# Patient Record
Sex: MALE | Employment: FULL TIME | URBAN - METROPOLITAN AREA
[De-identification: names, ages, dates, MRNs, and addresses within clinical notes are randomized per-mention and may not be internally consistent; named-entity substitution may affect disease eponyms.]

---

## 2020-12-23 ENCOUNTER — VIRTUAL VISIT (OUTPATIENT)
Dept: CONSULT | Facility: CLINIC | Age: 20
End: 2020-12-23
Attending: GENETIC COUNSELOR, MS
Payer: COMMERCIAL

## 2020-12-23 DIAGNOSIS — Z13.71 SCREENING FOR GENETIC DISEASE CARRIER STATUS: Primary | ICD-10-CM

## 2020-12-23 DIAGNOSIS — Z31.5 ENCOUNTER FOR PROCREATIVE GENETIC COUNSELING: ICD-10-CM

## 2020-12-23 PROCEDURE — 96040 HC GENETIC COUNSELING, EACH 30 MINUTES: CPT | Mod: GT | Performed by: GENETIC COUNSELOR, MS

## 2020-12-23 NOTE — LETTER
"  12/23/2020      RE: Perry Liu  [address]  [facility] [address]       Perry Liu is a 20 year old male who is being evaluated via a billable video visit.      The patient has been notified of following:     \"This video visit will be conducted via a call between you and your physician/provider. We have found that certain health care needs can be provided without the need for an in-person physical exam.  This service lets us provide the care you need with a video conversation.  If a prescription is necessary we can send it directly to your pharmacy.  If lab work is needed we can place an order for that and you can then stop by our lab to have the test done at a later time.    Video visits are billed at different rates depending on your insurance coverage.  Please reach out to your insurance provider with any questions.    If during the course of the call the physician/provider feels a video visit is not appropriate, you will not be charged for this service.\"    Patient has given verbal consent for Video visit? Yes  How would you like to obtain your AVS? Mail a copy  If you are dropped from the video visit, the video invite should be resent to: Send to e-mail at: No e-mail address on record  Will anyone else be joining your video visit? Caitlyn Penaloza LPN      Presenting Information:  Perry \"Jean\" Noe is a 20-year-old man whose wife Sarah has a family history of phenylketonuria (PKU).  The couple is planning to start a family and is interested in carrier screening for this condition.  I met with Jean and Sarah at their request for a virtual genetic counseling appointment.  During our visit a family history was collected, the genetics and inheritance of PKU were reviewed, options for carrier screening were discussed, and informed consent for genetic testing was obtained.     Family History:  A detailed pedigree was obtained and scanned into the electronic medical record.  It is " "significant for the following:     Jean is 20-years-old.  He has a history of SVT requiring medication until age 6, but reports he has \"grown out\" of this.  He is otherwise healthy.      Jean has a maternal half-sister who passed away due to AML at age 17.      Jean's mother is 45-years-old and thought to be healthy, but there is limited contact.  Jean is not in contact with his biological father.      Jean has no known family history of PKU.    Jean's wife Sarah has a history of Crohn's Disease, PCOS, chronic fatigue, endometriosis, and fibromyalgia.      Sarah two paternal half-brothers and three paternal half-sisters, all of whom are thought to be healthy, but there is limited contact.      Sarah has three biological maternal half-brothers, two of whom have PKU.      Sarah's mother is 46-years-old.  She has diabetes.  Sarah is not in contact with her biological father.      There is no reported family history of birth defects, developmental delay, or other known or suspected genetic conditions.     Jean is of [place] and [place] ancestry.  Sarah is of Lithuanian and Mac[place] ancestry.  Consanguinity was denied.     Discussion:  Today we reviewed that genes are long stretches of DNA that are responsible for how our bodies look and how our bodies work. We all have two copies of every gene; one inherited from the mother and one inherited from the father. When there is a change, called a mutation, in a gene it can cause it to not do its job correctly which can cause the signs and symptoms of a genetic condition.  PKU is caused by mutations in a gene called PAH.    The PAH gene is normally important for creating an enzyme that breaks down phenylalanine (Phe). Mutations in the PAH gene cause this enzyme to not do its job the way it is supposed to, resulting in phenylalanine not being broken down. This is toxic to the cells and unless a low phe diet is followed, has serious health and developmental consequences.    PKU is " inherited in an autosomal recessive pattern. This means that to be affected an individual must inherit a mutation in both copies of the PAH gene (one from each parent). Individuals with just one mutation in the PAH gene are said to be carriers. Carriers do not have cystic fibrosis but can have an affected child if their partner is also a carrier. When both parents are carriers, with each pregnancy there is a 25% chance for the child to be affected, a 50% chance to be a carrier only, and a 25% chance to be unaffected and not a carrier.    As noted above, Jean's wife Sarah has two maternal half-brothers with PKU.  This means Sarah's mother is an obligate carrier for PKU, and Sarah therefore has a 50% (1/2) chance to also be a carrier.  Jean has an approximately 2% (1/50) chance to be a carrier for PKU.  Without any testing, with each pregnancy the couple's chance to have a child with PKU is approximately 1/400.  This is compared to the general population chance of 1/10,000.  Carrier screening for Tonie can help refine this chance.      We reviewed the options for carrier screening including for PKU only, or expanded carrier screening for a large panel of genes in addition to PKU.  After a detailed discussion about the potential costs, benefits, and limitations of the options, Tonie opted to proceed with expanded carrier screening.  Verbal informed consent was obtained.  This will be performed at [facility] laboratory who will contact the couple directly with an estimate of insurance benefits and option for out of pocket billing.  Results are expected in approximately 2-3 weeks and I will contact the couple by telephone when results return.  Tonie gave verbal consent to share their results with one another.      Finally, we briefly discussed reproductive options available to the family in the event they are both found to be a carrier for PKU or another condition.   If the family wished to  determine during a future pregnancy if the baby is affected, prenatal testing is available.  This could be completed by chorionic villus sampling (CVS) or amniocentesis.  These procedures obtain a small sample of either the placenta or the amniotic fluid respectively to test for PKU.  Both of these procedures carry a small risk for miscarriage.  If the couple wished to avoid an affected pregnancy there is an option called pre-implantation genetic diagnosis (PGD).  PGD uses in vitro fertilization (IVF) to create very early embryos outside of the body.  The embryos are then tested for the condition and only the unaffected embryos are implanted.  Alternative options include egg or sperm donation, or adoption.  The family could also, of course, wait until a baby is born to be tested.  With each of these options there are medical, financial, ethical, and emotional considerations that every family must weight for themselves.      I appreciate the opportunity to be a part of Jean and his wife's care today.  They are encouraged to contact me with any additional questions or concerns.     Plan:  1.  Expanded carrier screening for Jean and his wife Sarah to [facility]; a saliva collection kit will be sent to their home  2.  [employer] will contact Jean directly with an estimate of insurance benefits   3.  I will contact Jean and Sarah by telephone when results return  4.  Additional genetic counseling as determined by results of the above testing      Neetu Jc MS [facility] [place]  Genetic Counselor  Division of Genetics and Metabolism      Video Visit Start Time: 12:55pm  Video Visit End Time: 1:29pm          Neetu Jc GC

## 2020-12-23 NOTE — LETTER
Date:January 6, 2021      Patient was self referred, no letter generated. Do not send.        [facility] Health Information

## 2020-12-23 NOTE — PROGRESS NOTES
"Perry Liu is a 20 year old male who is being evaluated via a billable video visit.      The patient has been notified of following:     \"This video visit will be conducted via a call between you and your physician/provider. We have found that certain health care needs can be provided without the need for an in-person physical exam.  This service lets us provide the care you need with a video conversation.  If a prescription is necessary we can send it directly to your pharmacy.  If lab work is needed we can place an order for that and you can then stop by our lab to have the test done at a later time.    Video visits are billed at different rates depending on your insurance coverage.  Please reach out to your insurance provider with any questions.    If during the course of the call the physician/provider feels a video visit is not appropriate, you will not be charged for this service.\"    Patient has given verbal consent for Video visit? Yes  How would you like to obtain your AVS? Mail a copy  If you are dropped from the video visit, the video invite should be resent to: Send to e-mail at: No e-mail address on record  Will anyone else be joining your video visit? Caitlyn Penaloza LPN      Presenting Information:  Perry \"Jean\"Neo is a 20-year-old man whose wife Sarah has a family history of phenylketonuria (PKU).  The couple is planning to start a family and is interested in carrier screening for this condition.  I met with Jean and Sarah at their request for a virtual genetic counseling appointment.  During our visit a family history was collected, the genetics and inheritance of PKU were reviewed, options for carrier screening were discussed, and informed consent for genetic testing was obtained.     Family History:  A detailed pedigree was obtained and scanned into the electronic medical record.  It is significant for the following:     Jean is 20-years-old.  He has a history of SVT requiring " "medication until age 6, but reports he has \"grown out\" of this.  He is otherwise healthy.      Jean has a maternal half-sister who passed away due to AML at age 17.      Jean's mother is 45-years-old and thought to be healthy, but there is limited contact.  Jean is not in contact with his biological father.      Jean has no known family history of PKU.    Jean's wife Sarah has a history of Crohn's Disease, PCOS, chronic fatigue, endometriosis, and fibromyalgia.      Sarah two paternal half-brothers and three paternal half-sisters, all of whom are thought to be healthy, but there is limited contact.      Sarah has three biological maternal half-brothers, two of whom have PKU.      Sarah's mother is 46-years-old.  She has diabetes.  Sarah is not in contact with her biological father.      There is no reported family history of birth defects, developmental delay, or other known or suspected genetic conditions.     Jean is of Kisw[place]ili and Occitan ancestry.  Sarah is of [place] and [place] ancestry.  Consanguinity was denied.     Discussion:  Today we reviewed that genes are long stretches of DNA that are responsible for how our bodies look and how our bodies work. We all have two copies of every gene; one inherited from the mother and one inherited from the father. When there is a change, called a mutation, in a gene it can cause it to not do its job correctly which can cause the signs and symptoms of a genetic condition.  PKU is caused by mutations in a gene called PAH.    The PAH gene is normally important for creating an enzyme that breaks down phenylalanine (Phe). Mutations in the PAH gene cause this enzyme to not do its job the way it is supposed to, resulting in phenylalanine not being broken down. This is toxic to the cells and unless a low phe diet is followed, has serious health and developmental consequences.    PKU is inherited in an autosomal recessive pattern. This means that to be affected an individual " must inherit a mutation in both copies of the PAH gene (one from each parent). Individuals with just one mutation in the PAH gene are said to be carriers. Carriers do not have cystic fibrosis but can have an affected child if their partner is also a carrier. When both parents are carriers, with each pregnancy there is a 25% chance for the child to be affected, a 50% chance to be a carrier only, and a 25% chance to be unaffected and not a carrier.    As noted above, Jean's wife Sarah has two maternal half-brothers with PKU.  This means Sarah's mother is an obligate carrier for PKU, and Sarah therefore has a 50% (1/2) chance to also be a carrier.  Jean has an approximately 2% (1/50) chance to be a carrier for PKU.  Without any testing, with each pregnancy the couple's chance to have a child with PKU is approximately 1/400.  This is compared to the general population chance of 1/10,000.  Carrier screening for Tonie can help refine this chance.      We reviewed the options for carrier screening including for PKU only, or expanded carrier screening for a large panel of genes in addition to PKU.  After a detailed discussion about the potential costs, benefits, and limitations of the options, Tonie opted to proceed with expanded carrier screening.  Verbal informed consent was obtained.  [facility] will contact the couple directly with an estimate of insurance benefits and option for out of pocket billing.  Results are expected in approximately 2-3 weeks and I will contact the couple by telephone when results return.  Tonie gave verbal consent to share their results with one another.      Finally, we briefly discussed reproductive options available to the family in the event they are both found to be a carrier for PKU or another condition.   If the family wished to determine during a future pregnancy if the baby is affected, prenatal testing is available.  This  could be completed by chorionic villus sampling (CVS) or amniocentesis.  These procedures obtain a small sample of either the placenta or the amniotic fluid respectively to test for PKU.  Both of these procedures carry a small risk for miscarriage.  If the couple wished to avoid an affected pregnancy there is an option called pre-implantation genetic diagnosis (PGD).  PGD uses in vitro fertilization (IVF) to create very early embryos outside of the body.  The embryos are then tested for the condition and only the unaffected embryos are implanted.  Alternative options include egg or sperm donation, or adoption.  The family could also, of course, wait until a baby is born to be tested.  With each of these options there are medical, financial, ethical, and emotional considerations that every family must weight for themselves.      I appreciate the opportunity to be a part of Jean and his wife's care today.  They are encouraged to contact me with any additional questions or concerns.     Plan:  1.  Expanded carrier screening for Jean and his wife Sarah to [facility]; a saliva collection kit will be sent to their home  2.  [email or web address] will contact Jean directly with an estimate of insurance benefits   3.  I will contact Jean and Sarah by telephone when results return  4.  Additional genetic counseling as determined by results of the above testing      Neetu Jc MS [facility]  Genetic Counselor  Division of Genetics and Metabolism      Video Visit Start Time: 12:55pm  Video Visit End Time: 1:29pm

## 2021-01-26 ENCOUNTER — TELEPHONE (OUTPATIENT)
Dept: PEDIATRICS | Facility: CLINIC | Age: 21
End: 2021-01-26

## 2021-01-26 NOTE — TELEPHONE ENCOUNTER
I contacted Jean's wife Sarah Smith to discuss the couple's pending genetic testing.  The laboratory has still not received their samples, and I contacted the family to answer any additional questions, as well as to see if anything else was needed from me to help complete this.  Sarah indicated she and Jean will submit the samples today.  I remain available for any additional questions.       Neetu Jc MS [facility] [place]  Genetic Counselor  Division of Genetics and Metabolism

## 2021-02-01 ENCOUNTER — APPOINTMENT (OUTPATIENT)
Dept: LAB | Facility: CLINIC | Age: 21
End: 2021-02-01
Attending: GENETIC COUNSELOR, MS
Payer: COMMERCIAL

## 2021-02-24 ENCOUNTER — TELEPHONE (OUTPATIENT)
Dept: PEDIATRICS | Facility: CLINIC | Age: 21
End: 2021-02-24

## 2021-02-24 DIAGNOSIS — Z13.71 SCREENING FOR GENETIC DISEASE CARRIER STATUS: ICD-10-CM

## 2021-02-24 LAB
LAB SCANNED RESULT: NORMAL
MISCELLANEOUS TEST: NORMAL

## 2021-02-24 NOTE — TELEPHONE ENCOUNTER
"I attempted to reach Perry \"Jean\" and spoke to his wife Sarah to discuss the results of their recent carrier screening.  Jean had previously given permission to discuss his results with Sarah.  Testing was pursued due to Sarah's family history of phenylketonuria (PKU).  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  The remainder of Sarah's expanded carrier screen returned negative (normal).  Jean's carrier screen for PKU and the other screened conditions was also negative (normal).  Therefore the couple's chance to have a child with PKU is now significantly reduced to less than 1/20,000.  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  If a child inherited both variants, this could result in an abnormal biochemical test but would not be expected to cause symptoms of Krabbe disease.  Sarah expressed understanding and had no additional questions at this time.  A results letter and copy of their lab reports will be sent to the family by mail.  I remain available for any additional questions or concerns.       Neetu Jc MS [facility] [place]  Genetic Counselor  Division of Genetics and Metabolism    "